# Patient Record
Sex: MALE | URBAN - METROPOLITAN AREA
[De-identification: names, ages, dates, MRNs, and addresses within clinical notes are randomized per-mention and may not be internally consistent; named-entity substitution may affect disease eponyms.]

---
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
## 2025-05-12 NOTE — ED PROVIDER NOTES
Patient Seen in: [facility] Emergency Department      History     Chief Complaint   Patient presents with    Rash Skin Problem     Stated Complaint: rash    Subjective:   HPI    24-year-old male with recent onset of a small red and macular describe rash around the wristband in the left wrist watch now starting yesterday spreading up the arms to the torso.  Extremely pruritic.  No fever or recent illness.  No known bites or known exposures.  He is healthy otherwise.  He recently returned from [place] few weeks ago.      History of Present Illness               Objective:     History reviewed. No pertinent past medical history.           History reviewed. No pertinent surgical history.             Social History     Socioeconomic History    Marital status: Single   Tobacco Use    Smoking status: Never    Smokeless tobacco: Never   Vaping Use    Vaping status: Some Days   Substance and Sexual Activity    Alcohol use: Never    Drug use: Never   Social History Narrative    ** Merged History Encounter **                                     Physical Exam     ED Triage Vitals [05/11/25 2109]   /88   Pulse 97   Resp 18   Temp 98.5 °F (36.9 °C)   Temp src Temporal   SpO2 99 %   O2 Device None (Room air)       Current Vitals:   Vital Signs  BP: 139/88  Pulse: 97  Resp: 18  Temp: 98.5 °F (36.9 °C)  Temp src: Temporal    Oxygen Therapy  SpO2: 99 %  O2 Device: None (Room air)        Physical Exam  Constitutional: Oriented to person, place, and time.  Appears well-developed. No distress.   Head: Normocephalic and atraumatic.   Eyes: Conjunctivae are normal. Pupils are equal, round, and reactive to light.   ENT: No obvious lip or tongue involvement  Neck: Normal range of motion. Neck supple.  No stridor noted macular and somewhat.  Appearing rash with hives to the arms and torso more in the back than the anterior portion of the torso.  Cardiovascular: Normal rate, regular rhythm and intact distal pulses.     Pulmonary/Chest: Effort normal. No respiratory distress.   Abdominal: Soft. There is no tenderness. There is no guarding.   Musculoskeletal: Normal range of motion. No edema or tenderness.   Neurological: Alert and oriented to person, place, and time.   Skin: Skin is warm and dry.   Psychiatric: Normal mood and affect.  Behavior is normal.   Nursing note and vitals reviewed.    Differential diagnosis includes nonspecific allergic reaction or exanthem.    Physical Exam                ED Course   Labs Reviewed - No data to display       Results                           MDM              Medical Decision Making  Patient is better here with prescribed treatment.  Vital stable.  Will go on a course of prescribed therapy.  Tylenol or Motrin are safe.  Benadryl as noted.  Patient will follow-[facility] in [facility] with any worsening or change.    Problems Addressed:  Allergic reaction, initial encounter: acute illness or injury with systemic symptoms    Risk  OTC drugs.  Prescription drug management.        Disposition and Plan     Clinical Impression:  1. Allergic reaction, initial encounter         Disposition:  There is no disposition on file for this visit.  There is no disposition time on file for this visit.    Follow-up:  Yefri Gómez DO  [address] [id number] [phone]    Schedule an appointment as soon as possible for a visit  As needed    Phil Jimenez MD  [address] [place] [id number] [phone]    Schedule an appointment as soon as possible for a visit  As needed          Medications Prescribed:  Current Discharge Medication List        START taking these medications    Details   predniSONE 20 MG Oral Tab Take 3 tablets (60 mg total) by mouth daily for 2 days, THEN 2 tablets (40 mg total) daily for 3 days, THEN 1 tablet (20 mg total) daily for 3 days.  Qty: 15 tablet, Refills: 0      diphenhydrAMINE 25 MG Oral Cap Take 2 capsules (50 mg total) by mouth every 6 (six) hours as  needed for Itching.  Qty: 40 capsule, Refills: 0      famotidine (PEPCID) 20 MG Oral Tab Take 1 tablet (20 mg total) by mouth daily for 7 days.  Qty: 7 tablet, Refills: 0             Supplementary Documentation:

## 2025-05-12 NOTE — ED INITIAL ASSESSMENT (HPI)
Pt presents to ed with c/o rash. Pt states he was in [place] 3 weeks ago and noticed a bump on his left wrist near his watch and thought it was a mosquito bite. States last night it worsened and spread across his body.     Redness and hives across pt neck, arms, chest, and back noted.     Denies cough or fever.     No meds pta